# Patient Record
Sex: FEMALE | Race: WHITE
[De-identification: names, ages, dates, MRNs, and addresses within clinical notes are randomized per-mention and may not be internally consistent; named-entity substitution may affect disease eponyms.]

---

## 2019-02-21 ENCOUNTER — HOSPITAL ENCOUNTER (EMERGENCY)
Dept: HOSPITAL 57 - BURERS | Age: 64
Discharge: HOME | End: 2019-02-21
Payer: SELF-PAY

## 2019-02-21 DIAGNOSIS — J20.9: Primary | ICD-10-CM

## 2019-02-21 PROCEDURE — 71046 X-RAY EXAM CHEST 2 VIEWS: CPT

## 2019-02-21 NOTE — RAD
CHEST TWO VIEWS

2/21/19

 

Comparison is made with a 10/2/17 study. 

 

The heart is normal in size. The lungs are clear. There is no definite infiltrate or effusion. Minima
l haziness in the right cardiophrenic angle is similar to the prior exam. There is no vascular conges
tion. The trachea is midline. 

 

IMPRESSION:  

No acute thoracic finding.

 

POS: HOME

## 2019-02-24 ENCOUNTER — HOSPITAL ENCOUNTER (EMERGENCY)
Dept: HOSPITAL 57 - BURERS | Age: 64
Discharge: HOME | End: 2019-02-24
Payer: SELF-PAY

## 2019-02-24 DIAGNOSIS — J30.9: Primary | ICD-10-CM

## 2019-02-24 PROCEDURE — 99283 EMERGENCY DEPT VISIT LOW MDM: CPT

## 2021-10-04 ENCOUNTER — HOSPITAL ENCOUNTER (EMERGENCY)
Dept: HOSPITAL 92 - ERS | Age: 66
Discharge: HOME | End: 2021-10-04
Payer: SELF-PAY

## 2021-10-04 DIAGNOSIS — Z20.822: ICD-10-CM

## 2021-10-04 DIAGNOSIS — J34.89: ICD-10-CM

## 2021-10-04 DIAGNOSIS — R05.9: Primary | ICD-10-CM

## 2021-10-04 DIAGNOSIS — R09.89: ICD-10-CM

## 2021-10-04 PROCEDURE — 71045 X-RAY EXAM CHEST 1 VIEW: CPT

## 2021-10-04 PROCEDURE — U0005 INFEC AGEN DETEC AMPLI PROBE: HCPCS

## 2021-10-04 PROCEDURE — U0003 INFECTIOUS AGENT DETECTION BY NUCLEIC ACID (DNA OR RNA); SEVERE ACUTE RESPIRATORY SYNDROME CORONAVIRUS 2 (SARS-COV-2) (CORONAVIRUS DISEASE [COVID-19]), AMPLIFIED PROBE TECHNIQUE, MAKING USE OF HIGH THROUGHPUT TECHNOLOGIES AS DESCRIBED BY CMS-2020-01-R: HCPCS

## 2022-09-24 ENCOUNTER — HOSPITAL ENCOUNTER (EMERGENCY)
Dept: HOSPITAL 92 - ERS | Age: 67
Discharge: HOME | End: 2022-09-24
Payer: SELF-PAY

## 2022-09-24 DIAGNOSIS — J06.9: Primary | ICD-10-CM

## 2022-09-24 DIAGNOSIS — Z20.822: ICD-10-CM

## 2022-09-24 PROCEDURE — U0003 INFECTIOUS AGENT DETECTION BY NUCLEIC ACID (DNA OR RNA); SEVERE ACUTE RESPIRATORY SYNDROME CORONAVIRUS 2 (SARS-COV-2) (CORONAVIRUS DISEASE [COVID-19]), AMPLIFIED PROBE TECHNIQUE, MAKING USE OF HIGH THROUGHPUT TECHNOLOGIES AS DESCRIBED BY CMS-2020-01-R: HCPCS

## 2022-09-24 PROCEDURE — U0005 INFEC AGEN DETEC AMPLI PROBE: HCPCS

## 2022-09-24 PROCEDURE — 99283 EMERGENCY DEPT VISIT LOW MDM: CPT

## 2022-09-24 PROCEDURE — 93005 ELECTROCARDIOGRAM TRACING: CPT
